# Patient Record
Sex: MALE
[De-identification: names, ages, dates, MRNs, and addresses within clinical notes are randomized per-mention and may not be internally consistent; named-entity substitution may affect disease eponyms.]

---

## 2024-10-18 ENCOUNTER — TELEPHONE (OUTPATIENT)
Facility: HOSPITAL | Age: 29
End: 2024-10-18

## 2024-10-18 NOTE — TELEPHONE ENCOUNTER
LVM to inform that Gael RUBI scheduled evaluation for pt on 10/21/24 w/ 10:40 arrival time for registration. Requested call back to confirm appt, and r/s if needed

## 2024-10-21 ENCOUNTER — HOSPITAL ENCOUNTER (OUTPATIENT)
Facility: HOSPITAL | Age: 29
Setting detail: RECURRING SERIES
Discharge: HOME | End: 2024-10-24
Payer: COMMERCIAL

## 2024-10-21 PROCEDURE — 97535 SELF CARE MNGMENT TRAINING: CPT

## 2024-10-21 PROCEDURE — 97110 THERAPEUTIC EXERCISES: CPT

## 2024-10-21 PROCEDURE — 97161 PT EVAL LOW COMPLEX 20 MIN: CPT

## 2024-10-21 NOTE — PROGRESS NOTES
PT DAILY TREATMENT NOTE/CERVICAL [id number]      Patient Name: César Trotter    Date: 10/21/2024    : 1995  Insurance: Payor: [employer] / Plan: [employer] / Product Type: *No Product type* /      Patient  verified no     Visit #   Current / Total 1 24   Time   In / Out 11:22 12:02   Pain   In / Out 1-2 2   Subjective Functional Status/Changes: See below      Treatment Area: Cervicalgia [M54.2]    SUBJECTIVE  Pain Level (0-10 scale): pain at worst: 2/10   []constant []intermittent []improving []worsening []no change since onset    Subjective functional status/changes:     PLOF: Patient is right hand dominant. Patient stated he works as an  and reports prior to 24 was able to perform all work duties without restriction.    Limitations to PLOF: Patient exhibits decreased cervical AROM with report of mild discomfort, decreased (B) shoulder and scapular strength, postural dysfunction, and decreased endurance in (B) UE. Patient demonstrates potential to make functional gains within a reasonable time frame. Patient would benefit from skilled PT to address above deficits and assist with return to PLOF.   Mechanism of Injury: Patient presents with cervical pain that began on 24 at work when patient was holding a garage door to put it back on the track.   Current symptoms/Complaints: Patient describes cervical pain as intermittent aching and predominately located at (B) cervical paraspinals (left>right). Patient reports occasionally experiencing radiating pain into (B) shoulder blade (left>right). Patient reports he did have numbness/tingling in 4th and 5th digit on the left and some in the right hand, but hasn't felt that in the last month. Patient has increased difficulty with lifting, pushing/pulling, and prolonged time with arms overhead. Patient stated he is currently working but limiting activity as able to avoid pain. Patient reports MD gave him a 20lbs

## 2024-10-21 NOTE — PROGRESS NOTES
[facility]  [address] [facility] [address] [place] [address] Ph:[phone] Fx: [phone]    PLAN OF CARE/ Statement of Necessity for Physical Therapy Services           Patient name: César Trotter Start of Care: 10/21/2024   Referral source: Hector Acevedo MD : 1995    Medical Diagnosis: Cervicalgia [M54.2]       Onset Date: 2024   Treatment Diagnosis: M54.2  NECK PAIN                                     Prior Hospitalization: see medical history Provider#: [id number]   Medications: Verified on Patient Summary List     Comorbidities: HTN    Prior Level of Function: Patient is right hand dominant. Patient stated he works as an  and reports prior to 24 was able to perform all work duties without restriction.      The Plan of Care and following information is based on the information from the initial evaluation.    Assessment / key information:  Patient presents with cervical pain that began on 24 at work when patient was holding a garage door to put it back on the track. Patient describes cervical pain as intermittent aching and predominately located at (B) cervical paraspinals (left>right). Patient reports occasionally experiencing radiating pain into (B) shoulder blade (left>right). Patient reports he did have numbness/tingling in 4th and 5th digit on the left and some in the right hand, but hasn't felt that in the last month. Patient has increased difficulty with lifting, pushing/pulling, and prolonged time with arms overhead. Patient stated he is currently working but limiting activity as able to avoid pain. Patient reports MD gave him a 20lbs lifting restriction at work. Patient exhibits decreased cervical AROM with report of mild discomfort, decreased (B) shoulder and scapular strength, postural dysfunction, and decreased endurance in (B) UE. Patient demonstrates potential to make functional gains within a

## 2024-10-24 ENCOUNTER — TELEPHONE (OUTPATIENT)
Facility: HOSPITAL | Age: 29
End: 2024-10-24

## 2024-10-30 ENCOUNTER — HOSPITAL ENCOUNTER (OUTPATIENT)
Facility: HOSPITAL | Age: 29
Setting detail: RECURRING SERIES
Discharge: HOME | End: 2024-11-02
Payer: COMMERCIAL

## 2024-10-30 PROCEDURE — 97530 THERAPEUTIC ACTIVITIES: CPT

## 2024-10-30 PROCEDURE — 97110 THERAPEUTIC EXERCISES: CPT

## 2024-10-30 PROCEDURE — 97112 NEUROMUSCULAR REEDUCATION: CPT

## 2024-10-30 PROCEDURE — 97140 MANUAL THERAPY 1/> REGIONS: CPT

## 2024-10-30 NOTE — PROGRESS NOTES
PHYSICAL / OCCUPATIONAL THERAPY - DAILY TREATMENT NOTE     Patient Name: César Trotter    Date: 10/30/2024    : 1995  Insurance: Payor: [employer] / Plan: [employer] / Product Type: *No Product type* /      Patient  verified Yes     Visit #   Current / Total 2 24   Time   In / Out 9:10 9:52   Pain   In / Out 1/10 0/10   Subjective Functional Status/Changes: Reports current pain is an \"annoyance\"   Changes to:  Allergies, Med Hx, Sx Hx?   no       TREATMENT AREA =  Cervicalgia [M54.2]    If an interpreting service is utilized for treatment of this patient, the contents of this document represent the material reviewed with the patient via the .     OBJECTIVE    Therapeutic Procedures:  Tx Min Billable or 1:1 Min (if diff from Tx Min) Procedure, Rationale, Specifics   16  [id number] Therapeutic Exercise (timed):  increase ROM, strength, coordination, balance, and proprioception to improve patient's ability to progress to PLOF and address remaining functional goals. (see flow sheet as applicable)    Details if applicable:       10  25741 Neuromuscular Re-Education (timed):  improve balance, coordination, kinesthetic sense, posture, core stability and proprioception to improve patient's ability to develop conscious control of individual muscles and awareness of position of extremities in order to progress to PLOF and address remaining functional goals. (see flow sheet as applicable)    Details if applicable:  Deflated ball series, ball on wall stabs, supine t-band PNF   8  55301 Therapeutic Activity (timed):  use of dynamic activities replicating functional movements to increase ROM, strength, coordination, balance, and proprioception in order to improve patient's ability to progress to PLOF and address remaining functional goals.  (see flow sheet as applicable)     Details if applicable:  Full cans flex/scap, wall slides with lift off.   8  53330 Manual Therapy (timed):  decrease pain,

## 2024-11-01 ENCOUNTER — HOSPITAL ENCOUNTER (OUTPATIENT)
Facility: HOSPITAL | Age: 29
Setting detail: RECURRING SERIES
Discharge: HOME | End: 2024-11-04
Payer: COMMERCIAL

## 2024-11-01 PROCEDURE — 97530 THERAPEUTIC ACTIVITIES: CPT

## 2024-11-01 PROCEDURE — 97110 THERAPEUTIC EXERCISES: CPT

## 2024-11-01 PROCEDURE — 97112 NEUROMUSCULAR REEDUCATION: CPT

## 2024-11-01 PROCEDURE — 97140 MANUAL THERAPY 1/> REGIONS: CPT

## 2024-11-01 NOTE — PROGRESS NOTES
PHYSICAL / OCCUPATIONAL THERAPY - DAILY TREATMENT NOTE     Patient Name: César Trotter    Date: 2024    : 1995  Insurance: Payor: [employer] / Plan: [employer] / Product Type: *No Product type* /      Patient  verified Yes     Visit #   Current / Total 3 24   Time   In / Out 9:12 9:48   Pain   In / Out 1/10 0/10   Subjective Functional Status/Changes: Pt reports neck feels a little better.   Changes to:  Allergies, Med Hx, Sx Hx?   no       TREATMENT AREA =  Cervicalgia [M54.2]    If an interpreting service is utilized for treatment of this patient, the contents of this document represent the material reviewed with the patient via the .     OBJECTIVE    Therapeutic Procedures:  Tx Min Billable or 1:1 Min (if diff from Tx Min) Procedure, Rationale, Specifics   8  81759 Therapeutic Exercise (timed):  increase ROM, strength, coordination, balance, and proprioception to improve patient's ability to progress to PLOF and address remaining functional goals. (see flow sheet as applicable)    Details if applicable:       10  06039 Neuromuscular Re-Education (timed):  improve balance, coordination, kinesthetic sense, posture, core stability and proprioception to improve patient's ability to develop conscious control of individual muscles and awareness of position of extremities in order to progress to PLOF and address remaining functional goals. (see flow sheet as applicable)    Details if applicable:  Deflated ball series, ball on wall stabs, supine t-band PNF    10  05105 Therapeutic Activity (timed):  use of dynamic activities replicating functional movements to increase ROM, strength, coordination, balance, and proprioception in order to improve patient's ability to progress to PLOF and address remaining functional goals.  (see flow sheet as applicable)     Details if applicable:  Full cans flex/scap, wall slides with lift off.    8  21648 Manual Therapy (timed):  decrease

## 2024-11-04 ENCOUNTER — HOSPITAL ENCOUNTER (OUTPATIENT)
Facility: HOSPITAL | Age: 29
Setting detail: RECURRING SERIES
Discharge: HOME | End: 2024-11-07
Payer: COMMERCIAL

## 2024-11-04 PROCEDURE — 97112 NEUROMUSCULAR REEDUCATION: CPT

## 2024-11-04 PROCEDURE — 97140 MANUAL THERAPY 1/> REGIONS: CPT

## 2024-11-04 PROCEDURE — 97530 THERAPEUTIC ACTIVITIES: CPT

## 2024-11-04 PROCEDURE — 97110 THERAPEUTIC EXERCISES: CPT

## 2024-11-04 NOTE — PROGRESS NOTES
PHYSICAL / OCCUPATIONAL THERAPY - DAILY TREATMENT NOTE     Patient Name: César Trotter    Date: 2024    : 1995  Insurance: Payor: [employer] / Plan: [employer] / Product Type: *No Product type* /      Patient  verified Yes     Visit #   Current / Total 4 24   Time   In / Out 9:10 9:55   Pain   In / Out 1/10 0/10   Subjective Functional Status/Changes: Reports that he has not noticed any pain flare ups.   Changes to:  Allergies, Med Hx, Sx Hx?   no       TREATMENT AREA =  Cervicalgia [M54.2]    If an interpreting service is utilized for treatment of this patient, the contents of this document represent the material reviewed with the patient via the .     OBJECTIVE    Therapeutic Procedures:  Tx Min Billable or 1:1 Min (if diff from Tx Min) Procedure, Rationale, Specifics   17  61492 Therapeutic Exercise (timed):  increase ROM, strength, coordination, balance, and proprioception to improve patient's ability to progress to PLOF and address remaining functional goals. (see flow sheet as applicable)    Details if applicable:       10  88018 Neuromuscular Re-Education (timed):  improve balance, coordination, kinesthetic sense, posture, core stability and proprioception to improve patient's ability to develop conscious control of individual muscles and awareness of position of extremities in order to progress to PLOF and address remaining functional goals. (see flow sheet as applicable)    Details if applicable:  Deflated ball series, ball on wall stabs, camilo t-band PNF    10  80496 Therapeutic Activity (timed):  use of dynamic activities replicating functional movements to increase ROM, strength, coordination, balance, and proprioception in order to improve patient's ability to progress to PLOF and address remaining functional goals.  (see flow sheet as applicable)     Details if applicable:  Full cans flex/scap, bent over rows   8  16640 Manual Therapy (timed):  decrease

## 2024-11-06 ENCOUNTER — HOSPITAL ENCOUNTER (OUTPATIENT)
Facility: HOSPITAL | Age: 29
Setting detail: RECURRING SERIES
Discharge: HOME | End: 2024-11-09
Payer: COMMERCIAL

## 2024-11-06 PROCEDURE — 97112 NEUROMUSCULAR REEDUCATION: CPT

## 2024-11-06 PROCEDURE — 97140 MANUAL THERAPY 1/> REGIONS: CPT

## 2024-11-06 PROCEDURE — 97110 THERAPEUTIC EXERCISES: CPT

## 2024-11-06 NOTE — PROGRESS NOTES
PHYSICAL / OCCUPATIONAL THERAPY - DAILY TREATMENT NOTE     Patient Name: César Trotter    Date: 2024    : 1995  Insurance: Payor: [employer] / Plan: [employer] / Product Type: *No Product type* /      Patient  verified Yes     Visit #   Current / Total 5 24   Time   In / Out 9:10 9:50   Pain   In / Out 0.5/10 0/10   Subjective Functional Status/Changes: Pt reports compliance with HEP.  Pt reports he has less pain today.    Changes to:  Allergies, Med Hx, Sx Hx?   no       TREATMENT AREA =  Cervicalgia [M54.2]    If an interpreting service is utilized for treatment of this patient, the contents of this document represent the material reviewed with the patient via the .     OBJECTIVE      Therapeutic Procedures:  Tx Min Billable or 1:1 Min (if diff from Tx Min) Procedure, Rationale, Specifics   24  30112 Therapeutic Exercise (timed):  increase ROM, strength, coordination, balance, and proprioception to improve patient's ability to progress to PLOF and address remaining functional goals. (see flow sheet as applicable)    Details if applicable:       8  38548 Neuromuscular Re-Education (timed):  improve balance, coordination, kinesthetic sense, posture, core stability and proprioception to improve patient's ability to develop conscious control of individual muscles and awareness of position of extremities in order to progress to PLOF and address remaining functional goals. (see flow sheet as applicable)    Details if applicable:     8  51743 Manual Therapy (timed):  decrease pain, increase ROM, and increase tissue extensibility to improve patient's ability to progress to PLOF and address remaining functional goals.  The manual therapy interventions were performed at a separate and distinct time from the therapeutic activities interventions . Details: SOR, DTM to bilateral UT/LS and C/S paraspinals with patient in supine.       Details if applicable:     40  [facility] Totals

## 2024-11-11 ENCOUNTER — HOSPITAL ENCOUNTER (OUTPATIENT)
Facility: HOSPITAL | Age: 29
Setting detail: RECURRING SERIES
Discharge: HOME | End: 2024-11-14
Payer: COMMERCIAL

## 2024-11-11 PROCEDURE — 97110 THERAPEUTIC EXERCISES: CPT

## 2024-11-11 PROCEDURE — 97112 NEUROMUSCULAR REEDUCATION: CPT

## 2024-11-11 PROCEDURE — 97140 MANUAL THERAPY 1/> REGIONS: CPT

## 2024-11-11 NOTE — PROGRESS NOTES
PHYSICAL / OCCUPATIONAL THERAPY - DAILY TREATMENT NOTE     Patient Name: César Trotter    Date: 2024    : 1995  Insurance: Payor: [employer] / Plan: [employer] / Product Type: *No Product type* /      Patient  verified Yes     Visit #   Current / Total 6 24   Time   In / Out 9:10 10:00   Pain   In / Out 0/10 0/10   Subjective Functional Status/Changes: Pt reports no new complaints.   Changes to:  Allergies, Med Hx, Sx Hx?   no       TREATMENT AREA =  Cervicalgia [M54.2]    If an interpreting service is utilized for treatment of this patient, the contents of this document represent the material reviewed with the patient via the .     OBJECTIVE    Modalities Rationale:     decrease pain and increase tissue extensibility to improve patient's ability to progress to PLOF and address remaining functional goals.      10   min  unbilled []  Ice     [x]  Heat    location/position: C/s, Pt seated.   Skin assessment post-treatment (if applicable):    []  intact    []  redness- no adverse reaction                 []redness - adverse reaction:         Therapeutic Procedures:  Tx Min Billable or 1:1 Min (if diff from Tx Min) Procedure, Rationale, Specifics   20  78979 Therapeutic Exercise (timed):  increase ROM, strength, coordination, balance, and proprioception to improve patient's ability to progress to PLOF and address remaining functional goals. (see flow sheet as applicable)    Details if applicable:       12  77913 Neuromuscular Re-Education (timed):  improve balance, coordination, kinesthetic sense, posture, core stability and proprioception to improve patient's ability to develop conscious control of individual muscles and awareness of position of extremities in order to progress to PLOF and address remaining functional goals. (see flow sheet as applicable)    Details if applicable:     8  31004 Manual Therapy (timed):  decrease pain, increase ROM, and increase tissue

## 2024-11-13 ENCOUNTER — APPOINTMENT (OUTPATIENT)
Facility: HOSPITAL | Age: 29
End: 2024-11-13
Payer: COMMERCIAL

## 2024-11-18 ENCOUNTER — HOSPITAL ENCOUNTER (OUTPATIENT)
Facility: HOSPITAL | Age: 29
Setting detail: RECURRING SERIES
Discharge: HOME | End: 2024-11-21
Payer: COMMERCIAL

## 2024-11-18 PROCEDURE — 97530 THERAPEUTIC ACTIVITIES: CPT

## 2024-11-18 PROCEDURE — 97110 THERAPEUTIC EXERCISES: CPT

## 2024-11-18 PROCEDURE — 97112 NEUROMUSCULAR REEDUCATION: CPT

## 2024-11-18 NOTE — PROGRESS NOTES
PHYSICAL / OCCUPATIONAL THERAPY - DAILY TREATMENT NOTE     Patient Name: César Trotter    Date: 2024    : 1995  Insurance: Payor: [employer] / Plan: [employer] / Product Type: *No Product type* /      Patient  verified Yes     Visit #   Current / Total 7 24   Time   In / Out 8:30 9:17   Pain   In / Out 1 1   Subjective Functional Status/Changes: Patient stated neck is doing better. Still occasionally getting flare ups, but are not as intense.     Changes to:  Allergies, Med Hx, Sx Hx?   no       TREATMENT AREA =  Cervicalgia [M54.2]    If an interpreting service is utilized for treatment of this patient, the contents of this document represent the material reviewed with the patient via the .     OBJECTIVE    Therapeutic Procedures:  Tx Min Billable or 1:1 Min (if diff from Tx Min) Procedure, Rationale, Specifics   16  13757 Therapeutic Exercise (timed):  increase ROM, strength, coordination, balance, and proprioception to improve patient's ability to progress to PLOF and address remaining functional goals. (see flow sheet as applicable)    Details if applicable:       23  20401 Neuromuscular Re-Education (timed):  improve balance, coordination, kinesthetic sense, posture, core stability and proprioception to improve patient's ability to develop conscious control of individual muscles and awareness of position of extremities in order to progress to PLOF and address remaining functional goals. (see flow sheet as applicable)    Details if applicable:     8  47224 Therapeutic Activity (timed):  use of dynamic activities replicating functional movements to increase ROM, strength, coordination, balance, and proprioception in order to improve patient's ability to progress to PLOF and address remaining functional goals.  (see flow sheet as applicable)     Details if applicable:           Details if applicable:            Details if applicable:     47  [facility] Totals Reminder: bill

## 2024-11-20 ENCOUNTER — HOSPITAL ENCOUNTER (OUTPATIENT)
Facility: HOSPITAL | Age: 29
Setting detail: RECURRING SERIES
Discharge: HOME | End: 2024-11-23
Payer: COMMERCIAL

## 2024-11-20 PROCEDURE — 97110 THERAPEUTIC EXERCISES: CPT

## 2024-11-20 PROCEDURE — 97140 MANUAL THERAPY 1/> REGIONS: CPT

## 2024-11-20 PROCEDURE — 97112 NEUROMUSCULAR REEDUCATION: CPT

## 2024-11-20 NOTE — PROGRESS NOTES
Current: Shoulder strength: flexion (B) 4/5, scaption (B) 4/5 2024.  3. Patient will improve (B) mid trap and lower trap strength to 4/5 to increase ease with household tasks.               Eval: lower trap strength: (B) 3+/5, mid trap strength: (B) 4-/5   4. Patient will be able to maintain (B) shoulder flexion at 90 deg for 45 seconds without neck pain to increase ease with work related tasks.               Eval: (B) UE hold at 90de sec then reported 2/10 pain in neck.              Current: met: patient able to maintain (B) shoulder flexion at 90deg for 45 seconds without neck pain (24)     PLAN  Yes  Continue plan of care  []  Upgrade activities as tolerated  []  Discharge due to :  []  Other:    Bryant Farah, PT    2024    10:45 AM    Future Appointments   Date Time Provider Department Center   2024  9:50 AM [facility], [facility]

## 2024-11-29 ENCOUNTER — HOSPITAL ENCOUNTER (OUTPATIENT)
Facility: HOSPITAL | Age: 29
Setting detail: RECURRING SERIES
Discharge: HOME | End: 2024-12-02
Payer: COMMERCIAL

## 2024-11-29 PROCEDURE — 97110 THERAPEUTIC EXERCISES: CPT

## 2024-11-29 PROCEDURE — 97530 THERAPEUTIC ACTIVITIES: CPT

## 2024-11-29 PROCEDURE — 97140 MANUAL THERAPY 1/> REGIONS: CPT

## 2024-11-29 NOTE — PROGRESS NOTES
PHYSICAL / OCCUPATIONAL THERAPY - DAILY TREATMENT NOTE     Patient Name: César Trotter    Date: 2024    : 1995  Insurance: Payor: [employer] / Plan: [employer] / Product Type: *No Product type* /      Patient  verified Yes     Visit #   Current / Total 9 24   Time   In / Out 955 1053   Pain   In / Out .5 0   Subjective Functional Status/Changes: Pt reports his neck is feeling a lot better but he still has some discomfort in his left scap that tends to increase with increased use of the left UE.   Changes to:  Allergies, Med Hx, Sx Hx?   no       TREATMENT AREA =  Cervicalgia [M54.2]    If an interpreting service is utilized for treatment of this patient, the contents of this document represent the material reviewed with the patient via the .     OBJECTIVE    Modalities Rationale:     decrease pain and increase tissue extensibility to improve patient's ability to progress to PLOF and address remaining functional goals.     min [] Estim Unattended, type/location:                                      []  w/ice    []  w/heat    min [] Estim Attended, type/location:                                     []  w/US     []  w/ice    []  w/heat    []  TENS insruct      min []  Mechanical Traction: type/lbs                   []  pro   []  sup   []  int   []  cont    []  before manual    []  after manual    min []  Ultrasound, settings/location:      min []  Iontophoresis w/ dexamethasone, location:                                               []  take home patch       []  in clinic     10   min  unbilled []  Ice     [x]  Heat    location/position: Seated, left shoulder.    min []  Paraffin,  details:     min []  Vasopneumatic Device, press/temp:     min []  Wh[employer]pool / Fluido:    If using vaso (only need to measure limb vaso being performed on)      pre-treatment girth :       post-treatment girth :       measured at (landmark location) :      min []  Other:    Skin assessment

## 2024-11-29 NOTE — PROGRESS NOTES
[facility]  [address] [place] [address] - Ph: [phone]   Fx: [phone]    PHYSICAL THERAPY PROGRESS NOTE      Patient name: César Trotter Start of Care: 10/21/2024    Referral source: Hector Acevedo MD : 1995    Medical Diagnosis: Cervicalgia [M54.2]  Payor: [employer] / Plan: [employer] / Product Type: *No Product type* /  Onset Date: 2024     Treatment Diagnosis:  M54.2  NECK PAIN    Prior Hospitalization: see medical history Provider#: [id number]   Comorbidities: HTN  Prior Level of Function: Patient is right hand dominant. Patient stated he works as an  and reports prior to 24 was able to perform all work duties without restriction.      Reporting Period: 10/21/2024-24  Visits from Start of Care: 9    Missed Visits: 0    Goals/Measure of Progress: To be achieved in 12 weeks:  Short Term Goals: To be accomplished in 12 treatments  Patient will be independent with HEP to increase ease with ADLs.   Eval; HEP established  PN: Met, pt reports compliance.    2. Patient will improve cervical AROM to WFL in all planes without pain reported to increase ease with driving.               Eval: Cervical AROM flexion: 36deg, extension: 34deg Lateral flexion: (B) 20deg, rotation: Right: 55deg, left: 58deg with report of mild pain               PN: AROM C/S flexion 35, extension 40, lateral flexion Right 24, Left 26, Rotation Right 58, Left 58 degrees.   3. Patient will report pain at worst no more then 1/10 to increase ease with self care tasks.               Eval: pain at worst: 2/10               PN: Pt reports pain is better since SOC    Long Term Goals: To be accomplished in 24 treatments  Patient will improve NDI to 10% or less to demonstrate improved function.   Eval: NDI: 20%  PN: progressing NDI score 16%  2. Patient will improve (B) shoulder flexion and scaption strength to

## 2024-12-05 ENCOUNTER — TELEPHONE (OUTPATIENT)
Facility: HOSPITAL | Age: 29
End: 2024-12-05

## 2024-12-05 NOTE — TELEPHONE ENCOUNTER
patient has follow up w/ MD on 12/11/24 concerning pt visits, he will contact us following appt to see if WC approves any more PT

## 2024-12-18 ENCOUNTER — TELEPHONE (OUTPATIENT)
Facility: HOSPITAL | Age: 29
End: 2024-12-18

## 2024-12-18 NOTE — TELEPHONE ENCOUNTER
LVM for pt to CB re continuation of PT. Received authorization for additional visits through January.

## 2024-12-18 NOTE — TELEPHONE ENCOUNTER
patient awaiting follow up w/ ortho specialist and advice from WC  before scheduling additional pt appts